# Patient Record
Sex: MALE | Race: WHITE | NOT HISPANIC OR LATINO | Employment: UNEMPLOYED | URBAN - METROPOLITAN AREA
[De-identification: names, ages, dates, MRNs, and addresses within clinical notes are randomized per-mention and may not be internally consistent; named-entity substitution may affect disease eponyms.]

---

## 2022-11-20 ENCOUNTER — HOSPITAL ENCOUNTER (EMERGENCY)
Facility: HOSPITAL | Age: 22
Discharge: HOME | End: 2022-11-20
Attending: EMERGENCY MEDICINE

## 2022-11-20 VITALS
WEIGHT: 235.45 LBS | DIASTOLIC BLOOD PRESSURE: 91 MMHG | RESPIRATION RATE: 20 BRPM | HEART RATE: 87 BPM | OXYGEN SATURATION: 100 % | SYSTOLIC BLOOD PRESSURE: 144 MMHG | TEMPERATURE: 98.1 F

## 2022-11-20 DIAGNOSIS — F29 PSYCHOSES (HCC): Primary | ICD-10-CM

## 2022-11-20 LAB
AMPHETAMINES SERPL QL SCN: NEGATIVE
BARBITURATES UR QL: NEGATIVE
BENZODIAZ UR QL: NEGATIVE
COCAINE UR QL: NEGATIVE
ETHANOL EXG-MCNC: 0 MG/DL
METHADONE UR QL: NEGATIVE
OPIATES UR QL SCN: NEGATIVE
OXYCODONE+OXYMORPHONE UR QL SCN: NEGATIVE
PCP UR QL: NEGATIVE
SARS-COV-2 RNA RESP QL NAA+PROBE: NEGATIVE
THC UR QL: POSITIVE

## 2022-11-20 NOTE — ED PROVIDER NOTES
History  Chief Complaint   Patient presents with   • Psychiatric Evaluation     Patient arrives with APD from [facility] d/t increased paranoia x3 weeks; patient states he is taking his medications appropriately, has dx of schizophrenia and PTSD  Reports "muffled noises" and "shadows" which patient says are normal for him, denies SI/HI, denies drug/etoh use  Requesting to be admitted for increased paranoia and medication adjustment     78-year-old male presented emergency department with worsening paranoia, worsening hallucinations  Notes that they were better for a short period time  Lives at the [facility]  Notes that despite being on Abilify Maintena as well as 2 other medications he cannot name, patient having worsening hallucinations, seeing shadows all the time, projecting himself in bad situations  Notes that he is depressed but not suicidal   Not homicidal   He is from [place] but just came [facility] on the 16th November  History provided by:  Patient      None       History reviewed  No pertinent past medical history  History reviewed  No pertinent surgical history  History reviewed  No pertinent family history  I have reviewed and agree with the history as documented  E-Cigarette/Vaping     E-Cigarette/Vaping Substances     Social History     Tobacco Use   • Smoking status: Every Day     Packs/day: 0 50     Types: Cigarettes   • Smokeless tobacco: Never   Substance Use Topics   • Alcohol use: Not Currently   • Drug use: Not Currently       Review of Systems   Constitutional: Negative for chills and fever  HENT: Negative for ear pain and sore throat  Eyes: Negative for pain and visual disturbance  Respiratory: Negative for cough and shortness of breath  Cardiovascular: Negative for chest pain and palpitations  Gastrointestinal: Negative for abdominal pain and vomiting  Genitourinary: Negative for dysuria and hematuria     Musculoskeletal: Negative for arthralgias and back pain  Skin: Negative for color change and rash  Neurological: Negative for seizures and syncope  Psychiatric/Behavioral: Positive for hallucinations  All other systems reviewed and are negative  Physical Exam  Physical Exam  Vitals and nursing note reviewed  Constitutional:       General: He is not in acute distress  Appearance: He is well-developed  HENT:      Head: Normocephalic and atraumatic  Nose: Nose normal       Mouth/Throat:      Mouth: Mucous membranes are moist    Eyes:      Conjunctiva/sclera: Conjunctivae normal    Cardiovascular:      Rate and Rhythm: Normal rate and regular rhythm  Heart sounds: No murmur heard  Pulmonary:      Effort: Pulmonary effort is normal  No respiratory distress  Breath sounds: Normal breath sounds  Abdominal:      Palpations: Abdomen is soft  Tenderness: There is no abdominal tenderness  Musculoskeletal:         General: No swelling  Cervical back: Neck supple  Skin:     General: Skin is warm and dry  Capillary Refill: Capillary refill takes less than 2 seconds  Neurological:      Mental Status: He is alert and oriented to person, place, and time     Psychiatric:         Mood and Affect: Mood normal          Vital Signs  ED Triage Vitals [11/20/22 1731]   Temperature Pulse Respirations Blood Pressure SpO2   98 1 °F (36 7 °C) 87 20 144/91 100 %      Temp Source Heart Rate Source Patient Position - Orthostatic VS BP Location FiO2 (%)   Oral Monitor Sitting Left arm --      Pain Score       --           Vitals:    11/20/22 1731   BP: 144/91   Pulse: 87   Patient Position - Orthostatic VS: Sitting         Visual Acuity      ED Medications  Medications - No data to display    Diagnostic Studies  Results Reviewed     Procedure Component Value Units Date/Time    COVID only [[id number]]  (Normal) Collected: 11/20/22 1831    Lab Status: Final result Specimen: Nares from Nose Updated: 11/20/22 1932     SARS-CoV-2 Negative Narrative:      FOR PEDIATRIC PATIENTS - copy/paste COVID Guidelines URL to browser: [email or web address]    SARS-CoV-2 assay is a Nucleic Acid Amplification assay intended for the  qualitative detection of nucleic acid from SARS-CoV-2 in nasopharyngeal  swabs  Results are for the presumptive identification of SARS-CoV-2 RNA  Positive results are indicative of infection with SARS-CoV-2, the virus  causing COVID-19, but do not rule out bacterial infection or co-infection  with other viruses  Laboratories within the [place] and its  territories are required to report all positive results to the appropriate  public health authorities  Negative results do not preclude SARS-CoV-2  infection and should not be used as the sole basis for treatment or other  patient management decisions  Negative results must be combined with  clinical observations, patient history, and epidemiological information  This test has not been FDA cleared or approved  This test has been authorized by FDA under an Emergency Use Authorization  (EUA)  This test is only authorized for the duration of time the  declaration that circumstances exist justifying the authorization of the  emergency use of an in vitro diagnostic tests for detection of SARS-CoV-2  virus and/or diagnosis of COVID-19 infection under section 564(b)(1) of  the Act, 21 U  S C  060IUK-8(T)(6), unless the authorization is terminated  or revoked sooner  The test has been validated but independent review by FDA  and CLIA is pending  Test performed using [employer] GeneXpert: This RT-PCR assay targets N2,  a region unique to SARS-CoV-2  A conserved region in the E-gene was chosen  for pan-Sarbecovirus detection which includes SARS-CoV-2  According to CMS-2020-01-R, this platform meets the definition of high-throughput technology      Rapid drug screen, urine [[id number]]  (Abnormal) Collected: 11/20/22 1831    Lab Status: Final result Specimen: Urine, Other Updated: 11/20/22 1917     Amph/Meth UR Negative     Barbiturate Ur Negative     Benzodiazepine Urine Negative     Cocaine Urine Negative     Methadone Urine Negative     Opiate Urine Negative     PCP Ur Negative     THC Urine Positive     Oxycodone Urine Negative    Narrative:      Presumptive report  If requested, specimen will be sent to reference lab for confirmation  FOR MEDICAL PURPOSES ONLY  IF CONFIRMATION NEEDED PLEASE CONTACT THE LAB WITHIN 5 DAYS  Drug Screen Cutoff Levels:  AMPHETAMINE/METHAMPHETAMINES  1000 ng/mL  BARBITURATES     200 ng/mL  BENZODIAZEPINES     200 ng/mL  COCAINE      300 ng/mL  METHADONE      300 ng/mL  OPIATES      300 ng/mL  PHENCYCLIDINE     25 ng/mL  THC       50 ng/mL  OXYCODONE      100 ng/mL    POCT alcohol breath test [[id number]]  (Normal) Resulted: 11/20/22 1850    Lab Status: Final result Updated: 11/20/22 1851     EXTBreath Alcohol 0 00                 No orders to display              Procedures  Procedures         ED Course                               SBIRT 20yo+    Flowsheet Row Most Recent Value   SBIRT (25 yo +)    In order to provide better care to our patients, we are screening all of our patients for alcohol and drug use  Would it be okay to ask you these screening questions? No Filed at: 11/20/2022 1805                    MDM  Number of Diagnoses or Management Options  [facility])  Diagnosis management comments: Patient to signed 61 51 81 for psych admission          Disposition  Final diagnoses:   Psychoses ([address] )     Time reflects when diagnosis was documented in both MDM as applicable and the Disposition within this note     Time User Action Codes Description Comment    11/20/2022  7:05 PM Arminda Adam Add [F29] Psychoses [facility])       ED Disposition     ED Disposition   Transfer to Behavioral Health    Condition   --    Date/Time   Sun Nov 20, 2022  7:05 PM    Comment   Rachel Xiao should be transferred out to TBD and has been medically cleared  Follow-up Information    None         Patient's Medications    No medications on file       No discharge procedures on file      PDMP Review     None          ED Provider  Electronically Signed by           Kathlynn Merlin, MD  11/20/22 2009

## 2022-11-21 NOTE — ED NOTES
Pt requesting Kosher food, RN provided with Kosher snacks available in the dept at this time        Vanessa Matthew RN  11/20/22 2042

## 2022-11-21 NOTE — ED NOTES
Assumed care of pt at this time  Pt resting with no signs of distress noted  Q15 checks in progress  Will continue to monitor        Gladis Sosa RN  11/20/22 1950

## 2022-11-21 NOTE — ED NOTES
This writer discussed the patients current presentation and recommended discharge plan with Dr Everardo Zapata  The patient was Instructed to follow up with the [facility] following day  Patient should also reach out to outside medical professional, Dr Mandeep Miller [phone] to report medication changes and complications  The patient was provided with referral information for: [address] [facility] Programs     This writer and the patient completed a safety plan  The patient was provided with a copy of their safety plan with encouragement to utilize the plan following discharge  In addition, the patient was instructed to call [facility] crisis, other crisis services, [facility] or to go to the nearest ER immediately if their situation changes at any time  This writer discussed discharge plans with the patient, who agrees with and understands the discharge plans  SAFETY PLAN  Warning Signs (negative or threatening thoughts and/or images, severe change in mood or behavior, situations) may be signs of a potential crisis  I should immediately find a safe and relaxing place to re-group until a trusted support person is able to help  I may reach out to my mothers, counselor, therapist and/or go immediately to the closest emergency room       For and further questions please contact [address] [place] [address] Suicide Prevention Hotline:  [address] [phone] - LVF Crisis/Mobile Crisis   [phone] - SLPF Crisis   [facility]: [phone]  [facility]: [place] [address] [place] [address] [address] [phone] - Crisis   [phone] - Peer Support Talk Line (1-9pm daily)  [phone] - Teen Support Talk Line (1-9pm daily)  [address] [facility] [address] [address] [place] ([place]) [phone] - [facility] [facility]

## 2022-11-21 NOTE — ED NOTES
Pt is a 25 y o  male who was brought to the ED with   Chief Complaint   Patient presents with   • Psychiatric Evaluation     Patient arrives with APD from [facility] d/t increased paranoia x3 weeks; patient states he is taking his medications appropriately, has dx of schizophrenia and PTSD  Reports "muffled noises" and "shadows" which patient says are normal for him, denies SI/HI, denies drug/etoh use  Requesting to be admitted for increased paranoia and medication adjustment     Intake Assessment completed, Safety risk Assessment completed  Pt will be discharged back to the [facility] for pt to continue his outpatient program    Tony Hardy     Pt presents to the Emergency Department, today, on the recommendation of the 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 for violating his parole  Pt just wants to get through this time and get back to his improving life  Pt shared feeling uneasy at times in the home, as he feels other consumers are aggressive and threatening to him  CIS assessed patient's mental health and suicidal risk  Pt has no risk for suicide  Pt presents as alert, oriented, and future-focused with a clearly stated plan to continue to access his outpatient mental health supports, on a weekly basis with his therapist and Psychiatrist through the [facility]  Pt also was able to identify informal supports to include his mother,  and his        Pt does not meet criteria or any need for any inpatient mental health treatment, at this time  Pt refused a 201 and appeared nervous about being away for the [facility] too long, as he feels they will steal his belongings  Pt was informed of his rights and opportunities for inpatient treatment should he feel the need       Treatment Trends - [facility] [address],   [address], [address] [facility]    Phone [phone]

## 2022-11-21 NOTE — DISCHARGE INSTRUCTIONS
This writer discussed the patients current presentation and recommended discharge plan with Dr Kita Adler  The patient was Instructed to follow up with the [facility] following day  Patient should also reach out to outside medical professional, Dr Marlin Em [phone] to report medication changes and complications  The patient was provided with referral information for: [address] [facility] Programs      This writer and the patient completed a safety plan  The patient was provided with a copy of their safety plan with encouragement to utilize the plan following discharge  In addition, the patient was instructed to call [facility] crisis, other crisis services, [facility] or to go to the nearest ER immediately if their situation changes at any time  This writer discussed discharge plans with the patient, who agrees with and understands the discharge plans  SAFETY PLAN  Warning Signs (negative or threatening thoughts and/or images, severe change in mood or behavior, situations) may be signs of a potential crisis  I should immediately find a safe and relaxing place to re-group until a trusted support person is able to help  I may reach out to my mothers, counselor, therapist and/or go immediately to the closest emergency room        For and further questions please contact [address] [place] [address] Suicide Prevention Hotline:  [address] [place] [address] [phone] - LVF Crisis/Mobile Crisis   [phone] - SLPF Crisis   [facility]: [phone]  [facility]: [place] [address] [phone] - Crisis   [phone] - Peer [address] (1-9pm daily)  [phone] - Teen Support Talk Line (1-9pm daily)  [phone] [facility] ([place], [place]) [phone]- Crisis [facility] [address] [facility] ([address] [place] [address]) [phone] - [address] [facility]

## 2022-12-18 ENCOUNTER — HOSPITAL ENCOUNTER (EMERGENCY)
Facility: HOSPITAL | Age: 22
End: 2022-12-19
Attending: EMERGENCY MEDICINE

## 2022-12-18 DIAGNOSIS — R45.851 SUICIDAL IDEATIONS: Primary | ICD-10-CM

## 2022-12-18 LAB
AMPHETAMINES SERPL QL SCN: NEGATIVE
BARBITURATES UR QL: NEGATIVE
BENZODIAZ UR QL: NEGATIVE
COCAINE UR QL: NEGATIVE
ETHANOL EXG-MCNC: 0 MG/DL
ETHANOL EXG-MCNC: 0 MG/DL
FLUAV RNA RESP QL NAA+PROBE: NEGATIVE
FLUBV RNA RESP QL NAA+PROBE: NEGATIVE
METHADONE UR QL: NEGATIVE
OPIATES UR QL SCN: NEGATIVE
OXYCODONE+OXYMORPHONE UR QL SCN: NEGATIVE
PCP UR QL: NEGATIVE
RSV RNA RESP QL NAA+PROBE: NEGATIVE
SARS-COV-2 RNA RESP QL NAA+PROBE: NEGATIVE
THC UR QL: NEGATIVE

## 2022-12-18 RX ORDER — HYDROXYZINE 50 MG/1
50 TABLET, FILM COATED ORAL 3 TIMES DAILY
COMMUNITY

## 2022-12-18 RX ORDER — NICOTINE 21 MG/24HR
1 PATCH, TRANSDERMAL 24 HOURS TRANSDERMAL EVERY 24 HOURS
COMMUNITY

## 2022-12-18 RX ORDER — QUETIAPINE FUMARATE 300 MG/1
300 TABLET, FILM COATED ORAL
COMMUNITY

## 2022-12-18 RX ORDER — CLONIDINE HYDROCHLORIDE 0.1 MG/1
0.1 TABLET ORAL EVERY 12 HOURS SCHEDULED
Status: DISCONTINUED | OUTPATIENT
Start: 2022-12-18 | End: 2022-12-19 | Stop reason: HOSPADM

## 2022-12-18 RX ORDER — CLONIDINE HYDROCHLORIDE 0.1 MG/1
0.1 TABLET ORAL EVERY 12 HOURS SCHEDULED
COMMUNITY

## 2022-12-18 RX ORDER — NICOTINE 21 MG/24HR
21 PATCH, TRANSDERMAL 24 HOURS TRANSDERMAL DAILY
Status: DISCONTINUED | OUTPATIENT
Start: 2022-12-18 | End: 2022-12-19 | Stop reason: HOSPADM

## 2022-12-18 RX ORDER — HYDROXYZINE 50 MG/1
50 TABLET, FILM COATED ORAL 3 TIMES DAILY PRN
Status: DISCONTINUED | OUTPATIENT
Start: 2022-12-18 | End: 2022-12-19 | Stop reason: HOSPADM

## 2022-12-18 RX ORDER — QUETIAPINE FUMARATE 300 MG/1
300 TABLET, FILM COATED ORAL
Status: DISCONTINUED | OUTPATIENT
Start: 2022-12-19 | End: 2022-12-19 | Stop reason: HOSPADM

## 2022-12-18 RX ORDER — ARIPIPRAZOLE 400 MG
400 KIT INTRAMUSCULAR ONCE
COMMUNITY

## 2022-12-18 NOTE — LETTER
[facility] EMERGENCY DEPARTMENT  [address] [facility] [address]  [phone]  Dept: [facility] TRANSFER CONSENT    NAME Vani Peraza                                         2000                              MRN [id number]    I have been informed of my rights regarding examination, treatment, and transfer   by Dr Marybel Bernal MD    Benefits:  mental health treatment     Risks:   delay in treatment    { ED EMTALA TRANSFER CHOICES:[id number]}    I authorize the performance of emergency medical procedures and treatments upon me in both transit and upon arrival at the receiving facility  Additionally, I authorize the release of any and all medical records to the receiving facility and request they be transported with me, if possible  I understand that the safest mode of transportation during a medical emergency is an ambulance and that the Hospital advocates the use of this mode of transport  Risks of traveling to the receiving facility by car, including absence of medical control, life sustaining equipment, such as oxygen, and medical personnel has been explained to me and I fully understand them  (LEROY CORRECT BOX BELOW)  [ x ]  I consent to the stated transfer and to be transported by ambulance/helicopter  [  ]  I consent to the stated transfer, but refuse transportation by ambulance and accept full responsibility for my transportation by car    I understand the risks of non-ambulance transfers and I exonerate the Hospital and its staff from any deterioration in my condition that results from this refusal     X___________________________________________    DATE  22  TIME________  Signature of patient or legally responsible individual signing on patient behalf           RELATIONSHIP TO PATIENT_______self__________________          Provider Certification    NAME Vani Peraza                                        VIKKI 2000 MRN [id number]    A medical screening exam was performed on the above named patient  Based on the examination:    Condition Necessitating Transfer The encounter diagnosis was Suicidal ideations  Patient Condition:  depression with suicidal thoughts     Reason for Transfer:  inpatient treatment    Transfer Requirements: [address] [facility], [place]   · Space available and qualified personnel available for treatment as acknowledged by  Ginna Gomez in admissions  · Agreed to accept transfer and to provide appropriate medical treatment as acknowledged by        Dr Lew Germain  · Appropriate medical records of the examination and treatment of the patient are provided at the time of transfer   [address] __ap_____  · Transfer will be performed by qualified personnel from   [address] [place] [address]  and appropriate transfer equipment as required, including the use of necessary and appropriate life support measures  Provider Certification: I have examined the patient and explained the following risks and benefits of being transferred/refusing transfer to the patient/family:   voluntary mental health trearment      Based on these reasonable risks and benefits to the patient and/or the unborn child(rayray), and based upon the information available at the time of the patient’s examination, I certify that the medical benefits reasonably to be expected from the provision of appropriate medical treatments at another medical facility outweigh the increasing risks, if any, to the individual’s medical condition, and in the case of labor to the unborn child, from effecting the transfer      X____________________________________________ DATE 12/19/22        TIME_______      ORIGINAL - SEND TO MEDICAL RECORDS   COPY - SEND WITH PATIENT DURING TRANSFER

## 2022-12-19 VITALS
OXYGEN SATURATION: 96 % | DIASTOLIC BLOOD PRESSURE: 65 MMHG | RESPIRATION RATE: 12 BRPM | HEART RATE: 64 BPM | SYSTOLIC BLOOD PRESSURE: 94 MMHG | TEMPERATURE: 97.6 F

## 2022-12-19 NOTE — ED NOTES
Patient remains asleep, no distress noted  1:1 remains at bedside  Will assess patient when he wakes        Rylie Miguel RN  12/19/22 6480

## 2022-12-19 NOTE — ED NOTES
Patient medication list from [address] [facility] reviewed and updated in patient's chart  Patient states he did not take HS meds today but does not want to take them zeus Bacon RN  12/18/22 5434

## 2022-12-19 NOTE — ED NOTES
Patient continues to sleep  No distress noted  1:1 remains at bedside  Patient to be assessed when he wakes        Lamont Persaud RN  12/19/22 9375

## 2022-12-19 NOTE — ED NOTES
Insurance Authorization for admission:   Phone call placed to [facility]   Phone number: [phone]  Spoke to Matthew William     *5 days approved  12/19-12/23  Level of care: inpatient mental health   Review on Friday 12/23   Authorization # ** to be given on admission from [employer]

## 2022-12-19 NOTE — ACP (ADVANCE CARE PLANNING)
Pt is a 25 y o  male who was brought to the ED with   Chief Complaint   Patient presents with   • Psychiatric Evaluation     Arrived via [employer]  Pt states he resides at [address] [place] [address] due to his agreement  and court mandate related to marijuana use  States SI for more than a year worse last 2 weeks  Did not tell staff at [address] [facility] completed, Safety risk Assessment completed  Pt will be admitted to D      Pt is 20-year-old male presented emergency department with worsening depression and thoughts of SI increasing over the past two weeks  CIS met with pt for an assessment on 11/20/2022 and notes that there have been no new triggers or identifiable reasons for the increase  Pt reports he is still living  at the [facility] (arrived on 11/16/22)  and has not participated in any drugs or alcohol use  Pt does not elaborate on answers and presents and irritable, but compliant  We CIS saw pt in November, he stated he was depressed but not SI or HI  Since pt currently has SI w/a plan to stab or cut himself with a knife, pt is agreeable to a 201  Pt does not want [facility] contacted as a treatment facility due to past poor experience       [facility]

## 2022-12-19 NOTE — ED NOTES
Patient now in ED room 3  Sleeping, no distress is noted  1:1 at bedside  Will assess patient when he wakes        Meghan Stone RN  12/18/22 5913

## 2022-12-19 NOTE — ED NOTES
Assumed care of patient  Patient resting in bed, 1:1 continues        Gerome Favre, RN  12/19/22 9753

## 2022-12-19 NOTE — ED NOTES
Patient sleeping, no distress noted  1:1 at bedside  Will assess patient when he wakes        Brie Mendez RN  12/19/22 6574

## 2022-12-19 NOTE — ED NOTES
Patient is accepted at [facility] [place] in [place]  Patient is accepted by Dr Nadya Jeffrey    Transportation is arranged with [facility]   Transportation is scheduled for 1130    Nurse report is to be called to the [place] unit, ph: [phone]  prior to patient transfer

## 2022-12-19 NOTE — ED NOTES
Melvin Holman - spoke to  [address] [phone]  She was notified that pt is going to be transfered to [facility] in [place]  She confirmed that he is on probation and has a PO in [facility]

## 2022-12-19 NOTE — ED PROVIDER NOTES
History  Chief Complaint   Patient presents with   • Psychiatric Evaluation     Arrived via [employer]  Pt states he resides at [address] [place] [address] due to his agreement  and court mandate related to marijuana use  States SI for more than a year worse last 2 weeks  Did not tell staff at [address] [place] [address]     Patient is a 50-year-old male with a history of [address] who presents from [facility] who presents with worsening suicidal ideations  States ongoing issues for a year but worse in the last 2 weeks  On meds  Currently court mandated at the [facility]  States has not tried talking to anyone there about his feelings  States plan to cut himself with a knife  Has never tried in the past   Denies any drugs or alcohol  Prior to Admission Medications   Prescriptions Last Dose Informant Patient Reported? Taking? ARIPiprazole ER (Abilify Maintena) 400 MG injection   Yes Yes   Sig: Inject 400 mg into a muscle once IM once monthly   QUEtiapine (SEROquel) 300 mg tablet   Yes Yes   Sig: Take 300 mg by mouth daily at bedtime   cloNIDine (CATAPRES) 0 1 mg tablet   Yes Yes   Sig: Take 0 1 mg by mouth every 12 (twelve) hours   hydrOXYzine HCL (ATARAX) 50 mg tablet   Yes Yes   Sig: Take 50 mg by mouth 3 (three) times a day   nicotine (NICODERM CQ) 21 mg/24 hr TD 24 hr patch   Yes Yes   Sig: Place 1 patch on the skin every 24 hours   nicotine polacrilex (NICORETTE) 4 mg gum   Yes Yes   Sig: Chew 4 mg as needed for smoking cessation      Facility-Administered Medications: None       Past Medical History:   Diagnosis Date   • Schizophrenia ([facility] [address] )        Past Surgical History:   Procedure Laterality Date   • NO PAST SURGERIES         History reviewed  No pertinent family history  I have reviewed and agree with the history as documented      E-Cigarette/Vaping   • E-Cigarette Use Never User      E-Cigarette/Vaping Substances     Social History     Tobacco Use   • Smoking status: Every Day     Packs/day: 0 50     Types: Cigarettes   • Smokeless tobacco: Never   Vaping Use   • Vaping Use: Never used   Substance Use Topics   • Alcohol use: Not Currently   • Drug use: Not Currently       Review of Systems   Constitutional: Negative  HENT: Negative  Eyes: Negative  Respiratory: Negative  Cardiovascular: Negative  Gastrointestinal: Negative  Endocrine: Negative  Genitourinary: Negative  Musculoskeletal: Negative  Skin: Negative  Allergic/Immunologic: Negative  Neurological: Negative  Hematological: Negative  Psychiatric/Behavioral: Positive for agitation, dysphoric mood and suicidal ideas  All other systems reviewed and are negative  Physical Exam  Physical Exam  Vitals and nursing note reviewed  Constitutional:       Appearance: Normal appearance  He is normal weight  HENT:      Head: Normocephalic and atraumatic  Nose: Nose normal       Mouth/Throat:      Mouth: Mucous membranes are moist       Pharynx: Oropharynx is clear  Cardiovascular:      Rate and Rhythm: Normal rate and regular rhythm  Pulses: Normal pulses  Heart sounds: Normal heart sounds  Pulmonary:      Effort: Pulmonary effort is normal       Breath sounds: Normal breath sounds  Abdominal:      General: Bowel sounds are normal       Palpations: Abdomen is soft  Musculoskeletal:         General: Normal range of motion  Cervical back: Normal range of motion and neck supple  Skin:     General: Skin is warm and dry  Capillary Refill: Capillary refill takes less than 2 seconds  Neurological:      General: No focal deficit present  Mental Status: He is alert and oriented to person, place, and time  Psychiatric:         Attention and Perception: Attention normal          Mood and Affect: Affect is labile  Speech: Speech normal          Behavior: Behavior is cooperative  Thought Content: Thought content includes suicidal ideation  Thought content does not include homicidal ideation  Vital Signs  ED Triage Vitals   Temperature Pulse Respirations Blood Pressure SpO2   12/18/22 2223 12/18/22 2148 12/18/22 2148 12/18/22 2148 12/18/22 2148   97 6 °F (36 4 °C) 74 16 133/78 98 %      Temp Source Heart Rate Source Patient Position - Orthostatic VS BP Location FiO2 (%)   12/18/22 2223 12/18/22 2148 12/18/22 2148 12/18/22 2148 --   Tympanic Monitor Sitting Left arm       Pain Score       12/18/22 2148       No Pain           Vitals:    12/18/22 2148   BP: 133/78   Pulse: 74   Patient Position - Orthostatic VS: Sitting         Visual Acuity      ED Medications  Medications   QUEtiapine (SEROquel) tablet 300 mg (has no administration in time range)   nicotine (NICODERM CQ) 21 mg/24 hr TD 24 hr patch 21 mg (21 mg Transdermal Not Given 12/18/22 2224)   nicotine polacrilex (NICORETTE) gum 4 mg (has no administration in time range)   cloNIDine (CATAPRES) tablet 0 1 mg (0 1 mg Oral Not Given 12/18/22 2224)   hydrOXYzine HCL (ATARAX) tablet 50 mg (has no administration in time range)       Diagnostic Studies  Results Reviewed     Procedure Component Value Units Date/Time    POCT alcohol breath test [[id number]]  (Normal) Resulted: 12/18/22 2229    Lab Status: Final result Updated: 12/18/22 2229     EXTBreath Alcohol 0 000    Rapid drug screen, urine [[id number]] Collected: 12/18/22 2216    Lab Status: In process Specimen: Urine, Clean Catch Updated: 12/18/22 2221    FLU/RSV/COVID - if FLU/RSV clinically relevant [[id number]] Collected: 12/18/22 2216    Lab Status:  In process Specimen: Nares from Nose Updated: 12/18/22 2221    POCT alcohol breath test [[id number]]  (Normal) Resulted: 12/18/22 2214    Lab Status: Final result Updated: 12/18/22 2214     EXTBreath Alcohol 0 000                 No orders to display              Procedures  Procedures         ED Course                               SBIRT 22yo+    Flowsheet Row Most Recent Value   SBIRT (23 yo +)    In order to provide better care to our patients, we are screening all of our patients for alcohol and drug use  Would it be okay to ask you these screening questions? Yes Filed at: 12/18/2022 2155   Initial Alcohol Screen: US AUDIT-C     1  How often do you have a drink containing alcohol? 0 Filed at: 12/18/2022 2155   2  How many drinks containing alcohol do you have on a typical day you are drinking? 0 Filed at: 12/18/2022 2155   3a  Male UNDER 65: How often do you have five or more drinks on one occasion? 0  [no etoh for 1 year] Filed at: 12/18/2022 2155   Audit-C Score 0 Filed at: 12/18/2022 2155   CALVIN: How many times in the past year have you    Used an illegal drug or used a prescription medication for non-medical reasons? Never Filed at: 12/18/2022 2155                    MDM  Number of Diagnoses or Management Options     Amount and/or Complexity of Data Reviewed  Clinical lab tests: ordered and reviewed  Tests in the medicine section of CPT®: reviewed and ordered  Obtain history from someone other than the patient: yes  Review and summarize past medical records: yes  Independent visualization of images, tracings, or specimens: yes        Disposition  Final diagnoses:   Suicidal ideations     Time reflects when diagnosis was documented in both MDM as applicable and the Disposition within this note     Time User Action Codes Description Comment    12/18/2022  9:46 PM Tia Young Add [T95 100] Suicidal ideations       ED Disposition     ED Disposition   Transfer to [address] [place] [address]   --    Date/Time   Sun Dec 18, 2022  9:46 PM    Comment   Neeraj Hoffmann should be transferred out to [facility] and has been medically cleared  Follow-up Information    None         Patient's Medications   Discharge Prescriptions    No medications on file       No discharge procedures on file      PDMP Review     None          ED Provider  Electronically Signed by           Aylin Krause MD  12/18/22 0223

## 2022-12-19 NOTE — ED NOTES
Patient sleeping  No distress noted  1:1 at bedside  Patient to be reassessed when he wakes        Evans Vieyra RN  12/19/22 [id number]

## 2022-12-19 NOTE — ED NOTES
Insurance  EVS (Eligibility Verification System) called - [phone]    Automated system indicates: active with [facility])  ID # [id number]

## 2022-12-19 NOTE — ED NOTES
Patient sleeping  No distress noted  1:1 remains at bedside  Assessments to be completed when patient wakes        Min Bruce RN  12/19/22 [id number]

## 2022-12-19 NOTE — ED NOTES
Bedsearch:    [facility] is not in network with patient's [facility] - fax records ([facility])  [address] - call after 9 for beds Prem Mandujano)